# Patient Record
Sex: FEMALE | Race: WHITE | Employment: PART TIME | URBAN - METROPOLITAN AREA
[De-identification: names, ages, dates, MRNs, and addresses within clinical notes are randomized per-mention and may not be internally consistent; named-entity substitution may affect disease eponyms.]

---

## 2022-06-06 ENCOUNTER — OFFICE VISIT (OUTPATIENT)
Dept: URGENT CARE | Facility: MEDICAL CENTER | Age: 37
End: 2022-06-06
Payer: COMMERCIAL

## 2022-06-06 VITALS
SYSTOLIC BLOOD PRESSURE: 132 MMHG | BODY MASS INDEX: 31.41 KG/M2 | HEART RATE: 86 BPM | TEMPERATURE: 98 F | RESPIRATION RATE: 18 BRPM | OXYGEN SATURATION: 96 % | DIASTOLIC BLOOD PRESSURE: 79 MMHG | HEIGHT: 64 IN | WEIGHT: 184 LBS

## 2022-06-06 DIAGNOSIS — H66.92 LEFT OTITIS MEDIA, UNSPECIFIED OTITIS MEDIA TYPE: Primary | ICD-10-CM

## 2022-06-06 PROCEDURE — G0382 LEV 3 HOSP TYPE B ED VISIT: HCPCS | Performed by: PHYSICIAN ASSISTANT

## 2022-06-06 RX ORDER — AMOXICILLIN 500 MG/1
500 CAPSULE ORAL EVERY 8 HOURS SCHEDULED
Qty: 21 CAPSULE | Refills: 0 | Status: SHIPPED | OUTPATIENT
Start: 2022-06-06 | End: 2022-06-13

## 2022-06-06 NOTE — LETTER
June 6, 2022     Patient: Rosalia Le   YOB: 1985   Date of Visit: 6/6/2022       To Whom it May Concern:    Rosalia Le was seen in my clinic on 6/6/2022  If you have any questions or concerns, please don't hesitate to call           Sincerely,          [address] [place]        CC: Rosalia Le

## 2022-06-06 NOTE — PATIENT INSTRUCTIONS
Otitis media left  Amoxicillin as directed  Follow up with PCP in 3-5 days  Proceed to  ER if symptoms worsen

## 2022-06-06 NOTE — PROGRESS NOTES
[email or web address] [facility]        NAME: Radha Stephen is a 30 y o  female  : 1985    MRN: [id number]  DATE: 2022  TIME: 10:36 AM    Assessment and Plan   Left otitis media, unspecified otitis media type [H66 92]  1  Left otitis media, unspecified otitis media type  amoxicillin (AMOXIL) 500 mg capsule         Patient Instructions     Otitis media left  Amoxicillin as directed  Follow up with PCP in 3-5 days  Proceed to  ER if symptoms worsen  Chief Complaint     Chief Complaint   Patient presents with    Facial Pain     Pt  C/O sinus pressure and congestion with intermittent left ear pain that began about 7 days ago  History of Present Illness       45-year-old female who presents complaining of runny nose, congestion, sinus pressure/pain, left ear pain  States and symptoms started 1 week ago but and became progressively worse over the last 2 days  Denies fevers, chills, chest pain, shortness off breath  His states that she did a at home test for COVID-19 which was negative and declined COVID test in office      Review of Systems   Review of Systems   Constitutional: Negative for activity change, appetite change, chills, diaphoresis, fatigue and fever  HENT: Positive for congestion, ear pain and rhinorrhea  Negative for ear discharge, facial swelling, hearing loss, mouth sores, nosebleeds, postnasal drip, sinus pressure, sinus pain, sneezing, sore throat and voice change  Respiratory: Positive for cough  Negative for apnea, choking, chest tightness, shortness of breath, wheezing and stridor  Cardiovascular: Negative            Current Medications       Current Outpatient Medications:     amoxicillin (AMOXIL) 500 mg capsule, Take 1 capsule (500 mg total) by mouth every 8 (eight) hours for 7 days, Disp: 21 capsule, Rfl: 0    Current Allergies     Allergies as of 2022    (No Known Allergies)            The following portions of the patient's history were reviewed and updated as appropriate: allergies, current medications, past family history, past medical history, past social history, past surgical history and problem list      Past Medical History:   Diagnosis Date    Patient denies medical problems        No past surgical history on file  No family history on file  Medications have been verified  Objective   /79   Pulse 86   Temp 98 °F (36 7 °C)   Resp 18   Ht 5' 4" (1 626 m)   Wt 83 5 kg (184 lb)   SpO2 96%   BMI 31 58 kg/m²        Physical Exam     Physical Exam  Constitutional:       General: She is not in acute distress  Appearance: Normal appearance  She is well-developed  She is not diaphoretic  HENT:      Head: Normocephalic and atraumatic  Right Ear: Hearing, tympanic membrane, ear canal and external ear normal       Left Ear: Hearing, ear canal and external ear normal  Tympanic membrane is erythematous and bulging  Nose: Rhinorrhea present  Mouth/Throat:      Pharynx: Uvula midline  Cardiovascular:      Rate and Rhythm: Normal rate and regular rhythm  Heart sounds: Normal heart sounds  Pulmonary:      Effort: Pulmonary effort is normal  No respiratory distress  Breath sounds: Normal breath sounds  No stridor  No wheezing, rhonchi or rales  Chest:      Chest wall: No tenderness  Musculoskeletal:      Cervical back: Normal range of motion and neck supple  Lymphadenopathy:      Cervical: Cervical adenopathy present  Neurological:      Mental Status: She is alert

## 2022-06-17 ENCOUNTER — OFFICE VISIT (OUTPATIENT)
Dept: URGENT CARE | Facility: MEDICAL CENTER | Age: 37
End: 2022-06-17
Payer: COMMERCIAL

## 2022-06-17 VITALS
HEART RATE: 69 BPM | BODY MASS INDEX: 30.73 KG/M2 | WEIGHT: 180 LBS | RESPIRATION RATE: 18 BRPM | HEIGHT: 64 IN | OXYGEN SATURATION: 98 % | TEMPERATURE: 98 F

## 2022-06-17 DIAGNOSIS — K04.7 DENTAL INFECTION: Primary | ICD-10-CM

## 2022-06-17 PROCEDURE — G0382 LEV 3 HOSP TYPE B ED VISIT: HCPCS | Performed by: PHYSICIAN ASSISTANT

## 2022-06-17 RX ORDER — AMOXICILLIN AND CLAVULANATE POTASSIUM 875; 125 MG/1; MG/1
1 TABLET, FILM COATED ORAL EVERY 12 HOURS SCHEDULED
Qty: 20 TABLET | Refills: 0 | Status: SHIPPED | OUTPATIENT
Start: 2022-06-17 | End: 2022-06-27

## 2022-06-17 NOTE — PROGRESS NOTES
[address]        NAME: Martell Baker is a 30 y o  female  : 1985    MRN: [id number]  DATE: 2022  TIME: 4:33 PM    Assessment and Plan   Dental infection [K04 7]  1  Dental infection  amoxicillin-clavulanate (AUGMENTIN) 875-125 mg per tablet         Patient Instructions       Follow up with PCP in 3-5 days  Proceed to  ER if symptoms worsen  Chief Complaint     Chief Complaint   Patient presents with    Dental Pain     Left lower dental pain; broken teeth; needs abx prior to going to dental apt          History of Present Illness       Patient is here for evaluation left lower jaw pain and swelling  Patient had a tooth that broke and now she feels is affected  She is trying to get in to see a dentist to have some work done      Review of Systems   Review of Systems   Constitutional: Negative  HENT: Positive for dental problem and facial swelling  Negative for congestion, drooling, ear discharge, ear pain, postnasal drip, rhinorrhea, sinus pressure, sinus pain, sneezing, sore throat and trouble swallowing  Current Medications       Current Outpatient Medications:     amoxicillin-clavulanate (AUGMENTIN) 875-125 mg per tablet, Take 1 tablet by mouth every 12 (twelve) hours for 10 days, Disp: 20 tablet, Rfl: 0    Current Allergies     Allergies as of 2022    (No Known Allergies)            The following portions of the patient's history were reviewed and updated as appropriate: allergies, current medications, past family history, past medical history, past social history, past surgical history and problem list      Past Medical History:   Diagnosis Date    Patient denies medical problems        History reviewed  No pertinent surgical history  History reviewed  No pertinent family history  Medications have been verified          Objective   Pulse 69   Temp 98 °F (36 7 °C)   Resp 18   Ht 5' 4" (1 626 m)   Wt 81 6 kg (180 lb)   SpO2 98%   BMI 30 90 kg/m² No LMP recorded  Physical Exam     Physical Exam  Vitals and nursing note reviewed  Constitutional:       General: She is not in acute distress  Appearance: Normal appearance  She is well-developed  She is not ill-appearing, toxic-appearing or diaphoretic  HENT:      Head: Normocephalic and atraumatic  Mouth/Throat:      Mouth: Mucous membranes are moist       Pharynx: No oropharyngeal exudate  Comments: Swelling and erythema of the left lower gums with 3  broken teeth  There is swelling of the lower jaw all noted externally  Tenderness present  Eyes:      Extraocular Movements: Extraocular movements intact  Conjunctiva/sclera: Conjunctivae normal       Pupils: Pupils are equal, round, and reactive to light  Lymphadenopathy:      Cervical: Cervical adenopathy present  Skin:     General: Skin is warm and dry  Findings: No rash  Neurological:      General: No focal deficit present  Mental Status: She is alert and oriented to person, place, and time  Psychiatric:         Mood and Affect: Mood normal          Behavior: Behavior normal          Thought Content:  Thought content normal          Judgment: Judgment normal

## 2022-06-17 NOTE — PATIENT INSTRUCTIONS
1   Warm saltwater rinses frequently    2  Take probiotics [i e  Yogurt, Acidophilus, Florastor (liquid)] daily  3   Over-the-counter medications as needed for symptomatic care  4    Advance activities as tolerated  5    Follow-up with your primary care physician in 3-4 days  6   Go to emergency room if symptoms are worsening      7   Warm compresses frequently

## 2024-01-03 ENCOUNTER — OFFICE VISIT (OUTPATIENT)
Dept: OBGYN CLINIC | Facility: CLINIC | Age: 39
End: 2024-01-03
Payer: COMMERCIAL

## 2024-01-03 VITALS
SYSTOLIC BLOOD PRESSURE: 110 MMHG | DIASTOLIC BLOOD PRESSURE: 74 MMHG | BODY MASS INDEX: 29.06 KG/M2 | WEIGHT: 170.2 LBS | HEIGHT: 64 IN

## 2024-01-03 DIAGNOSIS — Z01.419 ENCOUNTER FOR GYNECOLOGICAL EXAMINATION (GENERAL) (ROUTINE) WITHOUT ABNORMAL FINDINGS: ICD-10-CM

## 2024-01-03 DIAGNOSIS — Z12.4 SCREENING FOR CERVICAL CANCER: ICD-10-CM

## 2024-01-03 PROCEDURE — S0610 ANNUAL GYNECOLOGICAL EXAMINA: HCPCS | Performed by: PHYSICIAN ASSISTANT

## 2024-01-03 PROCEDURE — G0476 HPV COMBO ASSAY CA SCREEN: HCPCS | Performed by: PHYSICIAN ASSISTANT

## 2024-01-03 PROCEDURE — G0145 SCR C/V CYTO,THINLAYER,RESCR: HCPCS | Performed by: PHYSICIAN ASSISTANT

## 2024-01-03 NOTE — PROGRESS NOTES
ASSESSMENT & PLAN: Gisela Hernandez is a 38 y.o.  with normal gynecologic exam.    1.  Routine well woman exam done today  2.  Pap and HPV:  The patient's last pap and hpv was unknown.    Pap and cotesting was done today.    Current ASCCP Guidelines reviewed.   3.  The following were reviewed in today's visit: breast self exam, family planning choices, adequate intake of calcium and vitamin D, exercise, healthy diet, and age-appropriate recommendation regarding screenings and prevention.  4.  Briefly discussed contraception, patient declines hormonal contraception options.  Discussed rhythm method, coitus interruptus, condom, Plan B emergency if needed.  5.  Patient noting some intermittent brief episodes of burning in her left anterior axillary region off and on past few months, no breast involvement.  No known triggers and spontaneously resolves.  I do not palpate any distinct lymphadenopathy in the left breast today, no tenderness or reproducible symptoms with exam.  I cannot distinctly palpate any masses in the left breast.  Discussed diagnostic imaging of the left breast just to be sure versus watchful waiting and self breast exam once or twice a month.  Patient is aware that small masses can easily be missed with a breast exam, reassurance can be provided with diagnostic imaging.  Patient opts for self monitoring at home and will notify office if she notices any abnormality in the breast or if the pain becomes more frequent.  6.  Recommend patient establish with PCP for an annual physical, screening labs.  Also can follow-up with the burning chest discomfort symptoms as well.  7.  Consult Derm for skin check, mole concern.    RTO 1 year for annual, sooner problems arise in the interim.    CC:  Annual Gynecologic Examination    HPI: Gisela Hernandez is a 38 y.o.  who presents for annual gynecologic examination.  She is a new patient with our office.      She has the following concerns: Mole left lateral  chest adjacent to axilla has changed, scab that fell off.  She has appointment with dermatology.  Occasional burning left axilla, upper chest occurring a few times a month without known triggers, no associated symptoms of breast pain, lump or swelling in breast or left axilla.  Denies known trauma to area, but does lift heavy boxes at work.     She denies any active medical problems.  She does not take any prescription or over-the-counter medications.    Healthy diet Yes  Vitamins No  Exercise Yes; low impact cardio at home 3 days a week    Patient's last menstrual period was 2023.    Menses frequency: Regular, once a month  Length of bleedin days  Bleeding quality: Moderate  Pain/cramping with menses: Cramping, breast soreness.  Denies regular intermenstrual bleeding        Health Maintenance:      She does not perform regular monthly self breast exams.  Denies breast pain, lumps, skin change or nipple discharge.    She feels safe at home. Feels safe in relationship with her partner.     Past Medical History:   Diagnosis Date    Abnormal Pap smear of cervix     Depression     Patient denies medical problems        History reviewed. No pertinent surgical history.    Past OB/Gyn History:  OB History          3    Para   2    Term   2       0    AB   1    Living   1         SAB   0    IAB   1    Ectopic   0    Multiple   0    Live Births   1               Pt does not have menstrual issues.    History of abnormal pap smears: States years ago had an abnormal Pap smear postpartum but states was told it could be normal after delivery, denies known history of HPV..    Patient is currently sexually active.  Monogamous with partner.  The current method of family planning is rhythm method and coitus interruptus.    Family History   Problem Relation Age of Onset    Hypertension Mother     Diabetes Father        Family history of Breast/Uterine/Ovarian/Colon Cancer: Denies    Social History:  Social  History     Socioeconomic History    Marital status: /Civil Union     Spouse name: Not on file    Number of children: Not on file    Years of education: Not on file    Highest education level: Not on file   Occupational History    Not on file   Tobacco Use    Smoking status: Never    Smokeless tobacco: Never   Vaping Use    Vaping status: Never Used   Substance and Sexual Activity    Alcohol use: Yes     Comment: social    Drug use: Never    Sexual activity: Yes     Partners: Male     Birth control/protection: None   Other Topics Concern    Not on file   Social History Narrative    Not on file     Social Determinants of Health     Financial Resource Strain: Not on file   Food Insecurity: Not on file   Transportation Needs: Not on file   Physical Activity: Not on file   Stress: Not on file   Social Connections: Not on file   Intimate Partner Violence: Not on file   Housing Stability: Not on file         No Known Allergies    No current outpatient medications on file.      Review of Systems  Constitutional :no fever, feels well, no tiredness, no recent weight gain or loss  ENT: no ear ache, no loss of hearing, no nosebleeds or nasal discharge, no sore throat or hoarseness.  Cardiovascular: no complaints of slow or fast heart beat, no chest pain, no palpitations, no leg claudication or lower extremity edema.  Respiratory: no complaints of shortness of shortness of breath, no RAMIREZ  Breasts:no complaints of breast pain, breast lump, or nipple discharge  Gastrointestinal: no complaints of abdominal pain, constipation, nausea, vomiting, or diarrhea or bloody stools  Genitourinary : no complaints of dysuria, incontinence, pelvic pain, no dysmenorrhea, vaginal discharge/itching/odor or abnormal vaginal bleeding and as noted in HPI.  Musculoskeletal: Occasional left upper chest, anterior axillary burning discomfort intermittent a few times a month past 3 to 4 months.  No complaints of arthralgia, no myalgia, no joint  "swelling or stiffness, no limb pain or swelling.  Integumentary: no complaints of skin rash or lesion, itching or dry skin  Neurological: no complaints of headache, no confusion, no numbness or tingling, no dizziness or fainting  Mental health: no anxiety, depression, SI    Objective      /74 (BP Location: Left arm, Patient Position: Sitting, Cuff Size: Adult)   Ht 5' 4\" (1.626 m)   Wt 77.2 kg (170 lb 3.2 oz)   LMP 12/28/2023   Breastfeeding No   BMI 29.21 kg/m²   General:   appears stated age, cooperative, alert normal mood and affect.  BMI 29.21   Neck: normal, supple,trachea midline, no masses.  Thyroid palpated normal.   Heart: regular rate and rhythm, S1, S2 normal, no murmur, click, rub or gallop   Lungs: clear to auscultation bilaterally   Breasts: normal appearance, no masses or tenderness, No nipple retraction or dimpling, No nipple discharge or bleeding, No axillary or supraclavicular adenopathy, Normal to palpation without dominant masses   Abdomen: soft, non-tender, without masses or organomegaly   Vulva: normal female genitalia, no lesions   Vagina: normal vagina, no discharge, exudate, lesion, or erythema   Urethra: normal   Cervix: Normal, no discharge. PAP done. Nontender.   Uterus: normal size, contour, position, consistency, mobility, non-tender   Adnexa: no mass, fullness, tenderness   Lymphatic palpation of lymph nodes in neck, axilla, groin and/or other locations: no lymphadenopathy or masses noted   Skin normal skin turgor and no rashes.   Psychiatric orientation to person, place, and time: normal. mood and affect: normal     "

## 2024-01-05 LAB
HPV HR 12 DNA CVX QL NAA+PROBE: NEGATIVE
HPV16 DNA CVX QL NAA+PROBE: NEGATIVE
HPV18 DNA CVX QL NAA+PROBE: NEGATIVE

## 2024-01-09 LAB
LAB AP GYN PRIMARY INTERPRETATION: NORMAL
Lab: NORMAL